# Patient Record
Sex: MALE | Race: BLACK OR AFRICAN AMERICAN | NOT HISPANIC OR LATINO | URBAN - METROPOLITAN AREA
[De-identification: names, ages, dates, MRNs, and addresses within clinical notes are randomized per-mention and may not be internally consistent; named-entity substitution may affect disease eponyms.]

---

## 2020-01-23 ENCOUNTER — HOSPITAL ENCOUNTER (OUTPATIENT)
Dept: TELEMEDICINE | Facility: HOSPITAL | Age: 60
Discharge: HOME | End: 2020-01-23

## 2020-01-23 DIAGNOSIS — I63.512 ACUTE ISCHEMIC LEFT MCA STROKE: ICD-10-CM

## 2020-01-23 PROCEDURE — G0425 INPT/ED TELECONSULT30: HCPCS | Mod: GT,,, | Performed by: PSYCHIATRY & NEUROLOGY

## 2020-01-23 PROCEDURE — G0425 PR INPT TELEHEALTH CONSULT 30M: ICD-10-PCS | Mod: GT,,, | Performed by: PSYCHIATRY & NEUROLOGY

## 2020-01-23 NOTE — CONSULTS
[facility]  Vascular Neurology  Comprehensive Stroke Center  Tele-Consultation Note      Consults    Consulting Provider: SONIA DUMONT  Current Providers  No providers found    Patient Location: [facility] ED [facility] Emergency Department  Spoke hospital nurse at bedside with patient assisting consultant.     Patient information was obtained from relative(s).         Assessment/Plan:     STROKE DOCUMENTATION     Acute Stroke Times:   Acute Stroke Times   Last Known Normal Date: 01/23/20  Last Known Normal Time: (?)  Stroke Team Called Date: 01/23/20  Stroke Team Called Time: 1558  Stroke Team Arrival Date: 01/23/20  Stroke Team Arrival Time: 1602  CT Interpretation Time: 1605  Decision to Treat Time for IR: 1610    NIH Scale:  1a. Level of Consciousness: 1-->Not alert, but arousable by minor stimulation to obey, answer, or respond  1b. LOC Questions: 0-->Answers both questions correctly  1c. LOC Commands: 0-->Performs both tasks correctly  2. Best Gaze: 1-->Partial gaze palsy, gaze is abnormal in one or both eyes, but forced deviation or total gaze paresis is not present  3. Visual: 0-->No visual loss  4. Facial Palsy: 2-->Partial paralysis (total or near-total paralysis of lower face)  5a. Motor Arm, Left: 0-->No drift, limb holds 90 (or 45) degrees for full 10 secs  5b. Motor Arm, Right: 4-->No movement  6a. Motor Leg, Left: 0-->No drift, leg holds 30 degree position for full 5 secs  6b. Motor Leg, Right: 3-->No effort against gravity, leg falls to bed immediately  7. Limb Ataxia: 0-->Absent  8. Sensory: 1-->Mild-to-moderate sensory loss, patient feels pinprick is less sharp or is dull on the affected side, or there is a loss of superficial pain with pinprick, but patient is aware of being touched  9. Best Language: 0-->No aphasia, normal  10. Dysarthria: 1-->Mild-to-moderate dysarthria, patient slurs at least some words and, at worst,  can be understood with some difficulty  11. Extinction and Inattention (formerly Neglect): 1-->Visual, tactile, auditory, spatial, or personal inattention or extinction to bilateral simultaneous stimulation in one of the sensory modalities  Total (NIH Stroke Scale): 14     Modified Calhoun    Lachelle Coma Scale:    ABCD2 Score:    [id number] Score:   HAS -BLED Score:   ICH Score:   Hunt & Harding Classification:       Diagnoses:   Acute ischemic left MCA stroke  60 yo man with CAD, prostate PA presenting with L - MCA syndrome, though not aphasic.  Not in the window for TPA and already has evolving infarct on CT.  Recommend STAT transfer to facility with thrombectomy capabilities and neurosurgery (may need hemicraniectomy).    Antithrombotics for secondary stroke prevention: Antiplatelets: Aspirin: 81 mg daily    Statins for secondary stroke prevention and hyperlipidemia, if present:   Statins: Atorvastatin- 40 mg daily    Aggressive risk factor modification: HTN, Smoking     Rehab efforts: The patient has been evaluated by a stroke team provider and the therapy needs have been fully considered based off the presenting complaints and exam findings. The following therapy evaluations are needed: PT evaluate and treat, OT evaluate and treat, SLP evaluate and treat, PM&R evaluate for appropriate placement    Diagnostics ordered/pending: CTA Head to assess vasculature , CTA Neck/Arch to assess vasculature, HgbA1C to assess blood glucose levels, TTE to assess cardiac function/status , TSH to assess thyroid function    VTE prophylaxis: Heparin 5000 units SQ every 8 hours    BP parameters: Infarct: No intervention, SBP <220            There were no vitals taken for this visit.  Alteplase Eligible?: No  Alteplase Recommendation: Alteplase not recommended due to Outside of treatment window   Possible Interventional Revascularization Candidate? Yes    Disposition Recommendation: transfer to nearest appropriate facility      Subjective:     History of Present Illness:  58 y/o man with PMH CAD s/p MI, prostate CA, tobacco abuse, who presents with R hemiplegia.  Patient's family states they tried to dave him around 9 am and he was not answering the phone.  They went to check on him at 4 PM and found him down on the floor with R sided weakness.  Patient is unable to provide details about when he became weak.      Woke up with symptoms?: no    Recent bleeding noted: no  Does the patient take any Blood Thinners? no  Medications: Antiplatelets:  aspirin      Past Medical History: hypertension and MI/CAD    Past Surgical History: no relevant surgical history    Family History: no relevant history    Social History: smoker (active)    Allergies: Allergies have not been reviewed No relevant allergies    Review of Systems   Constitutional: Negative for fever.   HENT: Negative for nosebleeds.    Eyes: Negative for visual disturbance.   Respiratory: Negative for shortness of breath.    Cardiovascular: Negative for chest pain.   Genitourinary: Negative for dysuria.   Neurological: Positive for facial asymmetry and weakness. Negative for speech difficulty.     Objective:   Vitals: There were no vitals taken for this visit. BP: 191/103    CT READ: Yes  Abnormal CT early infarct signs throught L MCA territory, possible clot in M2 in insula.     Physical Exam   Constitutional:   Thin man, no distress   HENT:   Head: Normocephalic and atraumatic.   Eyes:   L gaze preference   Neurological:   MS: Alert, oriented, speech fluent, follows commands, R hemispatial neglect  CN: PERRL, EOMI, sensation intact, R facial droop, midl dysarthria  Motor: R sided plegia  Sens: reduced to pinch R side  Coord: no ataxia on finger to nose                 Recommended the emergency room physician to have a brief discussion with the patient and/or family if available regarding the risks and benefits of treatment, and to briefly document the occurrence of that discussion  in his clinical encounter note.     The attending portion of this evaluation, treatment, and documentation was performed per Nati Lozano MD via audiovisual.    Billing code:  (time dependent stroke, complex case, unstable patient, hemorrhages, any intervention, some mimics)    · This patient has a very critical neurological condition/illness, with very high morbidity and mortality.  · There is a very high probability for acute neurological change leading to clinical and possibly life-threatening deterioration requiring highest level of physician preparedness for urgent intervention.  · There is possibility that this condition will require treatment with high risk medications as quickly as possible.  · There is also a possibility that the patient may benefit from further, more advance complex therapies (e.g. endovascular therapy) that will require prompt diagnosis and care.  · Care was coordinated with other physicians involved in the patient's care.  · Radiologic studies and laboratory data were reviewed and interpreted, and plan of care was re-assessed based on the results.  · Diagnosis, treatment options and prognosis may have been discussed with the patient and/or family members or caregiver.  · Further advanced medical management and further evaluation is warranted for his care.      In your opinion, this was a: Tier 1 Van Positive    Consult End Time: 4:29 PM     Nati Lozano MD  Comprehensive Stroke Center  Vascular Neurology   [facility]

## 2020-01-23 NOTE — SUBJECTIVE & OBJECTIVE
Woke up with symptoms?: no    Recent bleeding noted: no  Does the patient take any Blood Thinners? no  Medications: Antiplatelets:  aspirin      Past Medical History: hypertension and MI/CAD    Past Surgical History: no relevant surgical history    Family History: no relevant history    Social History: smoker (active)    Allergies: Allergies have not been reviewed No relevant allergies    Review of Systems   Constitutional: Negative for fever.   HENT: Negative for nosebleeds.    Eyes: Negative for visual disturbance.   Respiratory: Negative for shortness of breath.    Cardiovascular: Negative for chest pain.   Genitourinary: Negative for dysuria.   Neurological: Positive for facial asymmetry and weakness. Negative for speech difficulty.     Objective:   Vitals: There were no vitals taken for this visit. BP: 191/103    CT READ: Yes  Abnormal CT early infarct signs throught L MCA territory, possible clot in M2 in insula.     Physical Exam   Constitutional:   Thin man, no distress   HENT:   Head: Normocephalic and atraumatic.   Eyes:   L gaze preference   Neurological:   MS: Alert, oriented, speech fluent, follows commands, R hemispatial neglect  CN: PERRL, EOMI, sensation intact, R facial droop, midl dysarthria  Motor: R sided plegia  Sens: reduced to pinch R side  Coord: no ataxia on finger to nose

## 2020-01-23 NOTE — HPI
60 y/o man with PMH CAD s/p MI, prostate CA, tobacco abuse, who presents with R hemiplegia.  Patient's family states they tried to dave him around 9 am and he was not answering the phone.  They went to check on him at 4 PM and found him down on the floor with R sided weakness.  Patient is unable to provide details about when he became weak.

## 2020-01-23 NOTE — ASSESSMENT & PLAN NOTE
60 yo man with CAD, prostate PA presenting with L - MCA syndrome, though not aphasic.  Not in the window for TPA and already has evolving infarct on CT.  Recommend STAT transfer to facility with thrombectomy capabilities and neurosurgery (may need hemicraniectomy).    Antithrombotics for secondary stroke prevention: Antiplatelets: Aspirin: 81 mg daily    Statins for secondary stroke prevention and hyperlipidemia, if present:   Statins: Atorvastatin- 40 mg daily    Aggressive risk factor modification: HTN, Smoking     Rehab efforts: The patient has been evaluated by a stroke team provider and the therapy needs have been fully considered based off the presenting complaints and exam findings. The following therapy evaluations are needed: PT evaluate and treat, OT evaluate and treat, SLP evaluate and treat, PM&R evaluate for appropriate placement    Diagnostics ordered/pending: CTA Head to assess vasculature , CTA Neck/Arch to assess vasculature, HgbA1C to assess blood glucose levels, TTE to assess cardiac function/status , TSH to assess thyroid function    VTE prophylaxis: Heparin 5000 units SQ every 8 hours    BP parameters: Infarct: No intervention, SBP <220